# Patient Record
(demographics unavailable — no encounter records)

---

## 2024-10-28 NOTE — PHYSICAL EXAM
[No Supraclavicular Adenopathy] : no supraclavicular adenopathy [No Cervical Adenopathy] : no cervical adenopathy [Clear to Auscultation Bilat] : clear to auscultation bilaterally [Examined in the supine and seated position] : examined in the supine and seated position [Symmetrical] : symmetrical [No dominant masses] : no dominant masses in right breast  [No dominant masses] : no dominant masses left breast [No Nipple Retraction] : no left nipple retraction [No Nipple Discharge] : no left nipple discharge [No Axillary Lymphadenopathy] : no left axillary lymphadenopathy [No Rashes] : no rashes [de-identified] : In the lateral breast incision there is some underlying fine soft nodularity consistent with fat necrosis.

## 2024-10-28 NOTE — PHYSICAL EXAM
[No Supraclavicular Adenopathy] : no supraclavicular adenopathy [No Cervical Adenopathy] : no cervical adenopathy [Clear to Auscultation Bilat] : clear to auscultation bilaterally [Examined in the supine and seated position] : examined in the supine and seated position [Symmetrical] : symmetrical [No dominant masses] : no dominant masses in right breast  [No dominant masses] : no dominant masses left breast [No Nipple Retraction] : no left nipple retraction [No Nipple Discharge] : no left nipple discharge [No Axillary Lymphadenopathy] : no left axillary lymphadenopathy [No Rashes] : no rashes [de-identified] : In the lateral breast incision there is some underlying fine soft nodularity consistent with fat necrosis.

## 2024-10-28 NOTE — HISTORY OF PRESENT ILLNESS
[FreeTextEntry1] : 12/22 Left BCT + SN Moderately differentiated invasive ductal carcinoma, 2.4cm, ER/OH-+, HER2- with a Ki-67 of 10 to 15%, 0/1 node Close superior margin for DCIS T2N0M0, Stage 1b; Oncotype 19 Whole breast radiation, anastrozole  Patient has noted some upper outer quadrant nodularity.  She is getting a mammogram and ultrasound later on today.  Patient is taking her anastrozole and following up with medical oncology. Patiently recently fell down on her face requiring her to go to the emergency room but ultimately she was okay.  88-year-old postmenopausal woman with a family history of breast and other malignancies presents with a 1 month history of a palpable left breast mass.  Recent mammogram showed a 1.6 cm mass with calcifications and ultrasound showed in the left breast at the 4 o'clock position, 7 cm from the nipple a 2 cm mass that is abutting the dermis, BI-RADS 4.  Patient denies any other breast masses or bone pain.  Patient has never had a breast biopsy.  Patient's twin sister had breast cancer at age 58.  Patient is of Ashkenazi Denominational ancestry.  Patient has never taken hormone replacement therapy.  Invitae gene tested negative.

## 2024-10-28 NOTE — HISTORY OF PRESENT ILLNESS
[FreeTextEntry1] : 12/22 Left BCT + SN Moderately differentiated invasive ductal carcinoma, 2.4cm, ER/NH-+, HER2- with a Ki-67 of 10 to 15%, 0/1 node Close superior margin for DCIS T2N0M0, Stage 1b; Oncotype 19 Whole breast radiation, anastrozole  Patient has noted some upper outer quadrant nodularity.  She is getting a mammogram and ultrasound later on today.  Patient is taking her anastrozole and following up with medical oncology. Patiently recently fell down on her face requiring her to go to the emergency room but ultimately she was okay.  88-year-old postmenopausal woman with a family history of breast and other malignancies presents with a 1 month history of a palpable left breast mass.  Recent mammogram showed a 1.6 cm mass with calcifications and ultrasound showed in the left breast at the 4 o'clock position, 7 cm from the nipple a 2 cm mass that is abutting the dermis, BI-RADS 4.  Patient denies any other breast masses or bone pain.  Patient has never had a breast biopsy.  Patient's twin sister had breast cancer at age 58.  Patient is of Ashkenazi Mormon ancestry.  Patient has never taken hormone replacement therapy.  Invitae gene tested negative.

## 2024-10-28 NOTE — REASON FOR VISIT
[Follow-Up: _____] : a [unfilled] follow-up visit [FreeTextEntry1] : Status post left partial mastectomy for a stage Ib left breast cancer

## 2024-10-29 NOTE — ASSESSMENT
[FreeTextEntry1] : discussed DNR order with patient and son who agreed to the DNR order.  They will follow up with Dr. Morales to complete MOLST  will check labs and urine to r/o any identifiable cause of increased paranoia and fall  3122 filled out

## 2024-10-29 NOTE — PHYSICAL EXAM
[Alert] : alert [No Acute Distress] : in no acute distress [Normal Outer Ear/Nose] : the ears and nose were normal in appearance [Normal Appearance] : the appearance of the neck was normal [Supple] : the neck was supple [No Respiratory Distress] : no respiratory distress [No Acc Muscle Use] : no accessory muscle use [Respiration, Rhythm And Depth] : normal respiratory rhythm and effort [Auscultation Breath Sounds / Voice Sounds] : lungs were clear to auscultation bilaterally [Heart Rate And Rhythm] : heart rate was normal and rhythm regular [Bowel Sounds] : normal bowel sounds [Abdomen Tenderness] : non-tender [Abdomen Soft] : soft [No Spinal Tenderness] : no spinal tenderness [Normal Color / Pigmentation] : normal skin color and pigmentation [Normal Turgor] : normal skin turgor [No Focal Deficits] : no focal deficits [Oriented To Time, Place, And Person] : oriented to person, place, and time [Normal Voice/Communication] : normal voice/communication [Sclera] : the sclera and conjunctiva were normal [Normal S1, S2] : normal S1 and S2 [EOMI] : extraocular movements were intact [Normal Gait] : normal gait [Normal Hearing] : hearing was not normal [de-identified] : calm, cooperative [de-identified] : resolving facial bruies uner right eye and along bridge of nose. two small wounds left thumb 0.5 diameter, closed with dermabond [de-identified] : having difficulty remembering throughout the visit

## 2024-10-29 NOTE — PHYSICAL EXAM
[Alert] : alert [No Acute Distress] : in no acute distress [Normal Outer Ear/Nose] : the ears and nose were normal in appearance [Normal Appearance] : the appearance of the neck was normal [Supple] : the neck was supple [No Respiratory Distress] : no respiratory distress [No Acc Muscle Use] : no accessory muscle use [Respiration, Rhythm And Depth] : normal respiratory rhythm and effort [Auscultation Breath Sounds / Voice Sounds] : lungs were clear to auscultation bilaterally [Heart Rate And Rhythm] : heart rate was normal and rhythm regular [Bowel Sounds] : normal bowel sounds [Abdomen Tenderness] : non-tender [Abdomen Soft] : soft [No Spinal Tenderness] : no spinal tenderness [Normal Color / Pigmentation] : normal skin color and pigmentation [Normal Turgor] : normal skin turgor [No Focal Deficits] : no focal deficits [Oriented To Time, Place, And Person] : oriented to person, place, and time [Normal Voice/Communication] : normal voice/communication [Sclera] : the sclera and conjunctiva were normal [Normal S1, S2] : normal S1 and S2 [EOMI] : extraocular movements were intact [Normal Gait] : normal gait [Normal Hearing] : hearing was not normal [de-identified] : calm, cooperative [de-identified] : resolving facial bruies uner right eye and along bridge of nose. two small wounds left thumb 0.5 diameter, closed with dermabond [de-identified] : having difficulty remembering throughout the visit

## 2024-10-29 NOTE — HISTORY OF PRESENT ILLNESS
[FreeTextEntry1] : Having increased paranoia behaviors including barricading her door so that noone comes in to her room afraid there are people coming under her door and into her apartment despite no evidence of this  Had a fall on 10/24 and was seen at ED left thumb 2 small 0.5 wounds closed with dermabond and facial bruising

## 2024-10-29 NOTE — REASON FOR VISIT
[Follow-Up] : a follow-up visit [Family Member] : family member [FreeTextEntry1] : 7961 fill out [FreeTextEntry3] : Syed Saenz

## 2024-10-29 NOTE — REASON FOR VISIT
[Follow-Up] : a follow-up visit [Family Member] : family member [FreeTextEntry1] : 0879 fill out [FreeTextEntry3] : Syed Saenz

## 2024-11-06 NOTE — HISTORY OF PRESENT ILLNESS
[PMH Reviewed and Updated] : past medical history reviewed and updated [PSH Reviewed and Updated] : past surgical history reviewed and updated [Family History Reviewed and Updated] : family history reviewed and updated [Medication and Allergies Reconciled] : medication and allergies reconciled [Patient reported hearing was abnormal] : Patient reported hearing was abnormal [Patient reported vision is normal] : Patient reported vision is normal [Patient reported colon/rectal/cancer screening was normal] : Patient reported colon/rectal cancer screening was normal [Patient reported breast cancer screening was normal] : Patient reported breast cancer screening was normal [Patient reported cervical cancer screening was normal] : Patient reported cervical cancer screening was normal [Any fall with injury in past year] : Patient reported fall with injury in the past year [NO] : No [0] : 2) Feeling down, depressed, or hopeless: Not at all (0) [PHQ-2 Negative - No further assessment needed] : PHQ-2 Negative - No further assessment needed [I have developed a follow-up plan documented below in the note.] : I have developed a follow-up plan documented below in the note. [Retired] : retired from work [None] : The patient has no concerns about alcohol abuse [Never] : has never used illicit drugs [Non compliant with medications] : Non compliant with medications [Adequate] : adequate [Friends] : friends [Children] : children [Fully Independent] : fully independent [Does not drive] : does not drive [History of falls] : history of falls [Seatbelts] : seatbelts [Smoke Detectors] : smoke detectors [Carbon Monoxide Detector] : carbon monoxide detector [Bathroom Grab Bars] : bathroom grab bars [Fall Prevention Measures] : fall prevention measures [Advanced Directives Discussed] : discussed at today's visit [Patient Has Full Capacity] : this patient has full decision making capacity for discussion of advance care planning [I will adhere to the patient's wishes.] : I will adhere to the patient's wishes. [Time Spent: ___ minutes] : Time Spent: [unfilled] minutes [Over the Past 2 Weeks, Have You Felt Down, Depressed, or Hopeless?] : 1.) Over the past 2 weeks, have you felt down, depressed, or hopeless? No [Over the Past 2 Weeks, Have You Felt Little Interest or Pleasure Doing Things?] : 2.) Over the past 2 weeks, have you felt little interest or pleasure doing things? No [de-identified] : alone [FreeTextEntry1] :  Patient is a 90-year-old female with past medical history of left-sided breast cancer, glaucoma, osteoporosis 11/06/2024 Dementia - History of mild dementia - More paranoid than usual - Paranoia has reduced since starting antibiotics for UTI  Glaucoma - Not taking eye medication consistently  Osteoporosis  UTI - Not taking prescribed antibiotic (nitrofurantoin) consistently  Fall - Fell 10 days ago, resulting in scrapes on nose and hands - Walking on uneven ground beside the road when she fell - Denies feeling lightheaded or dizzy before the fall - No pain in face or hand where she fell - Healed well from the fall  Misc - Walks at least an hour every day  09/19/2024 Patient is here for cognitive assessment and care plan visit.  Cognitive careplan checklist summary:  Memory assessment: MoCA 20 out of 30 ADLs/IADLs: Done PHQ-2, NPI-Q: Done Med rec: Done, printout provided Home Safety: Done Caregiver assessment: Son is involved.  Needs more help with managing her medications. ACP documentation: MOST form copy provided. FAST score: 4 Decision-making capacity: Complex decision-making capacity intact  Has black/blue spot on left eyebrow. not sure what happened.  She thinks she likely fell but does not remember when.  Seeing endocrine for osteoporosis shortly.  Heme oncology also monitoring osteoporosis.  06/19/2024 CALIXTO WATKINS is a 90 year yo White  female is coming in today to establish care. Patient has PMHx as listed below.   due for annual wellness visit  #Breast ca left Stage Ib stopped anastrazole; should be taking tamoxifen every day.  no evidence of recurrence  #Prolapsed uterus s/p hysterectomy    walks for an hour.   has hearing issue.   Patient has word finding difficulty. no h/o stroke. Memory loss going on for 2 yrs. Patient has been living alone. She lives in [facility].  no caregiver +  Fell recently; does not remember   patient had issue with a resident in her [facility]. 4 yrs ago had episode with her where she realized that the resident was lisa when she called the patient in to her appoint. patient thinks that she may have gotten key to her apartment. She feels people have been in to her apartment.  Has motion sensing camera at home which have shown no one coming to her apartment.   Education: completed 4 yrs of college Work: retired; worked in office ETOH/Smoking:  never smoked or drank alcohol Weight loss/malnutrition: stable : ; 1 son Immunization:  need to know about pneumococcal Screening: done Depression screening:  Medication reconciliation: done Allergies: done     4M Geriatric Screening Tests   Based on The 4Ms While Caring for Older Adults, an evidence-based focus on the key areas of mentation, mobility, medications, and what matters most (a guide by the [employer] and the [employer])     Medications: -Anticholinergic burden: no     Mobility:    -Chronic pain: no -Constipation: yes; will start miralax -Urinary symptoms: no     Frail Scale: 0/5   -Are you fatigued? no -Can you walk up one flight of stairs?y -Can you walk one block?y -Do you have more than 5 illnesses?n -Have you lost more than 5% of your weight in last 6 months?n       Mentation: -Hx of dementia:n -h/o delirium:n -Cognitive enhancing agents:n       Sleep: 8 hrs or less STOP-BANG Snoring: no Tiredness: no Observed Apnea:[ ] Pressure: High blood pressure[ ] BMI: higher than 35.[ ] Age: older than 50[ ] Neck Circumference: greater than 16 inches is considered a risk factor.[ ] Gender: Males[ ]     Matters Most  Independent as long as possible   [BreastSonogramDate] : 07/2024 [TextBox_25] : has osteoporosis; every 2 yrs [TextBox_31] : due [FreeTextEntry3] : 10/24 [[id number]] : 0 [FreeTextEntry4] :  Today  CALIXTO would like to discuss advance care planning.  We discussed the patient's desire for care and treatment if she were to face an end-of-life illness or an illness that affects her decision-making capabilities.  We also discussed what measures the patient would like to undergo if her heart were to stop beating and/or she is not able to breathe on her own. Who was present during the visit? patient and son What decisions were made? DNR/DNI, no feeding tube or dialysis - Wants to stay out of the hospital and get treated outside of the hospital as much as possible - Wants to be kept comfortable if she were in uncontrolled pain What forms were completed/given to the patient? MOLST form

## 2024-11-06 NOTE — PHYSICAL EXAM
[No Respiratory Distress] : no respiratory distress [No Acc Muscle Use] : no accessory muscle use [Respiration, Rhythm And Depth] : normal respiratory rhythm and effort [Auscultation Breath Sounds / Voice Sounds] : lungs were clear to auscultation bilaterally [Normal S1, S2] : normal S1 and S2 [Heart Rate And Rhythm] : heart rate was normal and rhythm regular [de-identified] : healing bruise around right eye

## 2024-11-25 NOTE — PHYSICAL EXAM
[Alert] : alert [Healthy Appearance] : healthy appearance [No Acute Distress] : no acute distress [Well Developed] : well developed [Normal Voice/Communication] : normal voice communication [Normal Sclera/Conjunctiva] : normal sclera/conjunctiva [EOMI] : extra ocular movement intact [Normal Hearing] : hearing was normal [No Respiratory Distress] : no respiratory distress [Normal Rate and Effort] : normal respiratory rate and effort [Normal Rate] : heart rate was normal [No Edema] : no peripheral edema [Not Distended] : not distended [No Spinal Tenderness] : no spinal tenderness [Spine Straight] : spine straight [No Stigmata of Cushings Syndrome] : no stigmata of Cushings Syndrome [Normal Gait] : normal gait [No Involuntary Movements] : no involuntary movements were seen [Normal Strength/Tone] : muscle strength and tone were normal [No Rash] : no rash [Oriented x3] : oriented to person, place, and time [Normal Affect] : the affect was normal [Recent Memory Normal] : recent memory was not impaired [Normal Insight/Judgement] : insight and judgment were intact [Normal Mood] : the mood was normal [Kyphosis] : no kyphosis present [Scoliosis] : no scoliosis [de-identified] : No observed focal neurological deficits. [de-identified] : Impaired remote memory

## 2024-11-25 NOTE — ASSESSMENT
[FreeTextEntry1] : Dunia has osteoporosis of the hip with osteopenia in other sites measured.  Recent DEXA scan from July 2024.  Her minimal calcium intake and vitamin D deficiency along with history of aromatase inhibitor use likely exacerbated her postmenopausal decreased bone mineral density.  I recommend initiating treatment with antiresorptive therapy.  We discussed both oral and IV options.  Since patient will be residing at [facility], I am reassured that she will be able to take weekly Fosamax without issue.  First, I would like to evaluate her for secondary causes of osteoporosis, namely hyperparathyroidism and vitamin D deficiency. The results of the testing will be communicated to the patient over the phone once they are available, along with any additional recommendations if necessary. She may continue taking tamoxifen. Discussed possible adverse effects of osteoporosis therapy, including hypocalcemia, injection site reactions, musculoskeletal aches, osteonecrosis of the jaw, and atypical femoral fractures. These complications do not occur often, even rarely. Routine labs will be drawn every 6 months to monitor for hypocalcemia and vitamin D deficiency. I recommend regular dental evaluations to prevent the need for extractions or implants, thus maintaining jawbone health. We discussed that Reclast infusion may cause flu-like reactions and other injections may cause injection-site reactions.  To reduce the risk of AFF, we aim to limit the duration of consecutive treatments to less than 5-7 years. We may consider a drug holiday once annual DEXA scans demonstrate resolution of osteoporosis, but this will require continued annual surveillance to monitor for bone loss. If she experiences hip or groin pain during treatment, she should contact me immediately for an evaluation with hip x-rays to investigate for AFF. If a fragility fracture occurs during treatment, we will need to consider an alternative therapy. Discussed supportive management by ingesting 3327-6388 mg elemental calcium and up to 2000 units vitamin D daily through diet and supplementation. Encouraged weight-bearing exercises and avoidance of prolonged bedrest.  Patient will return for routine follow-up every 6 to 12 months with labs completed prior to the visit.  She was encouraged to contact the clinic at any point the interim with concerns and questions. All questions and concerns were fully addressed to patient's satisfaction. Patient is in agreement with stated plan.  Note: This document has been prepared using voice dictation software. While efforts have been made to ensure accuracy, some typographical or transcription errors may be present. Please interpret the information accordingly.

## 2024-11-25 NOTE — HISTORY OF PRESENT ILLNESS
[FreeTextEntry1] : Resident at [facility]. Osteoporosis diagnosed by DEXA  Previous osteoporosis treatments: Denies Last DEXA scan: July 2024; worst T-score of -2.8 in the hip, -1.9 in the femoral neck and spine. History of fragility fractures: Denies Prior chemotherapy or radiation therapy: Breast cancer history. On tamoxifen for years. Previously received breast radiation therapy.  Prior long-duration of glucocorticoid use: One short course of treatment following breast surgery. Family history of fractures, osteoporosis, or hyperparathyroidism: Denies Calcium and Vitamin D through diet and supplementation: No supplementation. Calcium-poor diet. Height loss: Denies Physically active: Participates in regular rehab at [place]. Problems with balance or vision: Denies History of frequent falls: Denies, though had a recent fall outside. History of hyperparathyroidism: Denies History of hypercalcemia: Denies History of kidney stones: Denies History of malabsorption or eating disorder:  Denies Prior long-duration use of proton pump inhibitors (PPIs), antidepressants, anticoagulants, loop diuretics, or anti-epileptic medications:  Denies Difficulty swallowing pills, heartburn or reflux symptoms: Denies Caffeine, smoking, and alcohol history:  Denies Prolonged amenorrhea or early menopause:  Denies History of clots: Denies ASCVD History: Denies Dentist following:  Yes, no extractions/implants planned.

## 2024-11-25 NOTE — REVIEW OF SYSTEMS
[Confusion] : confusion [Negative] : Endocrine [All other systems negative] : All other systems negative [Dizziness] : no dizziness [Difficulty Walking] : no difficulty walking [Poor Balance] : steady state balance [de-identified] : Poor memory

## 2024-11-25 NOTE — REASON FOR VISIT
[Initial Evaluation] : an initial evaluation [Osteoporosis] : osteoporosis [Other: _____] : [unfilled] [Source: ______] : History obtained from [facility]

## 2025-01-06 NOTE — PHYSICAL EXAM
[Alert] : alert [No Acute Distress] : in no acute distress [Normal Outer Ear/Nose] : the ears and nose were normal in appearance [Normal Appearance] : the appearance of the neck was normal [Supple] : the neck was supple [No Respiratory Distress] : no respiratory distress [No Acc Muscle Use] : no accessory muscle use [Respiration, Rhythm And Depth] : normal respiratory rhythm and effort [Auscultation Breath Sounds / Voice Sounds] : lungs were clear to auscultation bilaterally [Heart Rate And Rhythm] : heart rate was normal and rhythm regular [Bowel Sounds] : normal bowel sounds [Abdomen Tenderness] : non-tender [Abdomen Soft] : soft [No Spinal Tenderness] : no spinal tenderness [Normal Color / Pigmentation] : normal skin color and pigmentation [Normal Turgor] : normal skin turgor [No Focal Deficits] : no focal deficits [Normal Affect] : the affect was normal [Normal Mood] : the mood was normal [Normal Gait] : normal gait [de-identified] : somewhat disheveled. [de-identified] : Abdomen slightly distended
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
## 2025-01-08 NOTE — CONSULT LETTER
[Dear  ___] : Dear  [unfilled], [Consult Letter:] : I had the pleasure of evaluating your patient, [unfilled]. [Please see my note below.] : Please see my note below. [Consult Closing:] : Thank you very much for allowing me to participate in the care of this patient.  If you have any questions, please do not hesitate to contact me. [Sincerely,] : Sincerely, [FreeTextEntry3] : Alba Norris DO, FACASHA, FACP\par  Medical Oncology and Hematology\par  [facility]\par   [DrVamsi  ___] : Dr. DUPREE

## 2025-01-08 NOTE — ASSESSMENT
[FreeTextEntry1] : # L sided IDC, histologic grade 2 measuring 24mm in greatest dimension, all margins negative, LN neg, ER %, KY %, Her 2 negative, Ki 67 10-15%. Pathological pT2N0 - anatomically stage IIA, prognostically stage IB Reviewed the diagnosis, prognosis and natural history of ER/KY+, HER2- IDC she is s/p lumpectomy with SLNbx oncotype 19 - no need for chemo Receiving RT - finish 1/25/23 Saw Dr. William. Saw Dr Carrasquillo - recent notes reviewed  DEXA scan - shows osteoporosis - recommend tamoxifen daily. reviewed side effect profile mammo/us scheduled for 10/24.  #osteoporosis continue ca++ and vit D continue weight bearing exercise limit alcohol maintain healthy BMI discussed fosamax, prolia or reclast - reviewed side effects. will need dental clearance  # anxiety recommend wellness center  #memory impairment followed by PCP  RTC in 4 months with cbc with diff, cmp, vit D. [Patient/Caregiver unclear of wishes] : Patient/Caregiver unclear of wishes [AdvancecareDate] : 07/17/24

## 2025-01-08 NOTE — PHYSICAL EXAM
[Fully active, able to carry on all pre-disease performance without restriction] : Status 0 - Fully active, able to carry on all pre-disease performance without restriction [Normal] : affect appropriate [de-identified] : deferred as Dr. William did exam today

## 2025-01-08 NOTE — HISTORY OF PRESENT ILLNESS
[de-identified] : Ms. Saenz is an 88 year old woman who presents for initial consultation of left breast IDC.\par  She palpated an area of concern in her left breast\par  10/20/2022 mammo: indeterminant irregular mass measuring 2x 1.1x 1.4cm in left breast @ 4:00 7cm from nipple\par  10/26/2022 s/p US-guided core biopsy\par  Path: invasive moderately differentiated ductal carcinoma, histologic grade 6/9, tumor measuring 13mm in greatest dimension\par  ER %, MN %, Her 2 negative, Ki 67 10-15%\par  2022 s/p left partial mastectomy wand sentinel lymph node sampling by Dr. Washington\par  Path: IDC, histologic grade 2 measuring 24mm in greatest dimension, all margins negative\par  DCIS measuring 24mm\par  One lymph node negative\par  Pathological pT2pN0\par  seen by Dr. Carrasquillo for consultation of adjuvant radiation \par  \par  Age of Menarche: 11\par  Age of Menopause\par  OCP/HRT: denies\par  , 28 at first birth \par  \par  Health Maintenance:\par  GYN\par  Mammmo\par  CNY\par  DEXA\par  seen by Genetic counselor: stat panel negative [de-identified] : Pt here for follow up Overall feels well States she is currently not taking any medications

## 2025-01-27 NOTE — HISTORY OF PRESENT ILLNESS
[FreeTextEntry1] :  Patient is a 90-year-old female with past medical history of left-sided breast cancer, glaucoma, osteoporosis 01/27/2025   - History of Present Illness: The patient was recently moved into a new assisted living facility, which may have influenced a heightened level of anxiety about unfamiliar surroundings. Episodes of anxiety and disorientation have been noticeable, where the patient had called her son expressing fears of being moved against her will to another place. The frequency of these episodes, as reported by the caregivers and her son, is unclear. Additionally, the patient has also displayed some forgetfulness with misplaced belongings.   She has also undergone treatment for urinary tract infection recently.  11/06/2024 Dementia - History of mild dementia - More paranoid than usual - Paranoia has reduced since starting antibiotics for UTI  Glaucoma - Not taking eye medication consistently  Osteoporosis  UTI - Not taking prescribed antibiotic (nitrofurantoin) consistently  Fall - Fell 10 days ago, resulting in scrapes on nose and hands - Walking on uneven ground beside the road when she fell - Denies feeling lightheaded or dizzy before the fall - No pain in face or hand where she fell - Healed well from the fall  Misc - Walks at least an hour every day  09/19/2024 Patient is here for cognitive assessment and care plan visit.  Cognitive careplan checklist summary:  Memory assessment: MoCA 20 out of 30 ADLs/IADLs: Done PHQ-2, NPI-Q: Done Med rec: Done, printout provided Home Safety: Done Caregiver assessment: Son is involved.  Needs more help with managing her medications. ACP documentation: MOST form copy provided. FAST score: 4 Decision-making capacity: Complex decision-making capacity intact  Has black/blue spot on left eyebrow. not sure what happened.  She thinks she likely fell but does not remember when.  Seeing endocrine for osteoporosis shortly.  Heme oncology also monitoring osteoporosis.  06/19/2024 CALIXTO WATKINS is a 90 year yo White  female is coming in today to establish care. Patient has PMHx as listed below.   due for annual wellness visit  #Breast ca left Stage Ib stopped anastrazole; should be taking tamoxifen every day.  no evidence of recurrence  #Prolapsed uterus s/p hysterectomy    walks for an hour.   has hearing issue.   Patient has word finding difficulty. no h/o stroke. Memory loss going on for 2 yrs. Patient has been living alone. She lives in [facility].  no caregiver +  Fell recently; does not remember   patient had issue with a resident in her [facility]. 4 yrs ago had episode with her where she realized that the resident was lisa when she called the patient in to her appoint. patient thinks that she may have gotten key to her apartment. She feels people have been in to her apartment.  Has motion sensing camera at home which have shown no one coming to her apartment.   Education: completed 4 yrs of college Work: retired; worked in office ETOH/Smoking:  never smoked or drank alcohol Weight loss/malnutrition: stable : ; 1 son Immunization:  need to know about pneumococcal Screening: done Depression screening:  Medication reconciliation: done Allergies: done     4M Geriatric Screening Tests   Based on The 4Ms While Caring for Older Adults, an evidence-based focus on the key areas of mentation, mobility, medications, and what matters most (a guide by the [employer] and the [employer])     Medications: -Anticholinergic burden: no     Mobility:    -Chronic pain: no -Constipation: yes; will start miralax -Urinary symptoms: no     Frail Scale: 0/5   -Are you fatigued? no -Can you walk up one flight of stairs?y -Can you walk one block?y -Do you have more than 5 illnesses?n -Have you lost more than 5% of your weight in last 6 months?n       Mentation: -Hx of dementia:n -h/o delirium:n -Cognitive enhancing agents:n       Sleep: 8 hrs or less STOP-BANG Snoring: no Tiredness: no Observed Apnea:[ ] Pressure: High blood pressure[ ] BMI: higher than 35.[ ] Age: older than 50[ ] Neck Circumference: greater than 16 inches is considered a risk factor.[ ] Gender: Males[ ]     Matters Most  Independent as long as possible

## 2025-01-27 NOTE — HISTORY OF PRESENT ILLNESS
[FreeTextEntry1] :  Patient is a 90-year-old female with past medical history of left-sided breast cancer, glaucoma, osteoporosis 01/27/2025   - History of Present Illness: The patient was recently moved into a new assisted living facility, which may have influenced a heightened level of anxiety about unfamiliar surroundings. Episodes of anxiety and disorientation have been noticeable, where the patient had called her son expressing fears of being moved against her will to another place. The frequency of these episodes, as reported by the caregivers and her son, is unclear. Additionally, the patient has also displayed some forgetfulness with misplaced belongings.   She has also undergone treatment for urinary tract infection recently.  11/06/2024 Dementia - History of mild dementia - More paranoid than usual - Paranoia has reduced since starting antibiotics for UTI  Glaucoma - Not taking eye medication consistently  Osteoporosis  UTI - Not taking prescribed antibiotic (nitrofurantoin) consistently  Fall - Fell 10 days ago, resulting in scrapes on nose and hands - Walking on uneven ground beside the road when she fell - Denies feeling lightheaded or dizzy before the fall - No pain in face or hand where she fell - Healed well from the fall  Misc - Walks at least an hour every day  09/19/2024 Patient is here for cognitive assessment and care plan visit.  Cognitive careplan checklist summary:  Memory assessment: MoCA 20 out of 30 ADLs/IADLs: Done PHQ-2, NPI-Q: Done Med rec: Done, printout provided Home Safety: Done Caregiver assessment: Son is involved.  Needs more help with managing her medications. ACP documentation: MOST form copy provided. FAST score: 4 Decision-making capacity: Complex decision-making capacity intact  Has black/blue spot on left eyebrow. not sure what happened.  She thinks she likely fell but does not remember when.  Seeing endocrine for osteoporosis shortly.  Heme oncology also monitoring osteoporosis.  06/19/2024 CALIXTO WATKINS is a 90 year yo White  female is coming in today to establish care. Patient has PMHx as listed below.   due for annual wellness visit  #Breast ca left Stage Ib stopped anastrazole; should be taking tamoxifen every day.  no evidence of recurrence  #Prolapsed uterus s/p hysterectomy    walks for an hour.   has hearing issue.   Patient has word finding difficulty. no h/o stroke. Memory loss going on for 2 yrs. Patient has been living alone. She lives in [facility].  no caregiver +  Fell recently; does not remember   patient had issue with a resident in her [facility]. 4 yrs ago had episode with her where she realized that the resident was lisa when she called the patient in to her appoint. patient thinks that she may have gotten key to her apartment. She feels people have been in to her apartment.  Has motion sensing camera at home which have shown no one coming to her apartment.   Education: completed 4 yrs of college Work: retired; worked in office ETOH/Smoking:  never smoked or drank alcohol Weight loss/malnutrition: stable : ; 1 son Immunization:  need to know about pneumococcal Screening: done Depression screening:  Medication reconciliation: done Allergies: done     4M Geriatric Screening Tests   Based on The 4Ms While Caring for Older Adults, an evidence-based focus on the key areas of mentation, mobility, medications, and what matters most (a guide by the [place] [employer] and the [employer])     Medications: -Anticholinergic burden: no     Mobility:    -Chronic pain: no -Constipation: yes; will start miralax -Urinary symptoms: no     Frail Scale: 0/5   -Are you fatigued? no -Can you walk up one flight of stairs?y -Can you walk one block?y -Do you have more than 5 illnesses?n -Have you lost more than 5% of your weight in last 6 months?n       Mentation: -Hx of dementia:n -h/o delirium:n -Cognitive enhancing agents:n       Sleep: 8 hrs or less STOP-BANG Snoring: no Tiredness: no Observed Apnea:[ ] Pressure: High blood pressure[ ] BMI: higher than 35.[ ] Age: older than 50[ ] Neck Circumference: greater than 16 inches is considered a risk factor.[ ] Gender: Males[ ]     Matters Most  Independent as long as possible

## 2025-02-13 NOTE — PHYSICAL EXAM
[Alert] : alert [No Acute Distress] : in no acute distress [Sclera] : the sclera and conjunctiva were normal [PERRL] : pupils were equal in size, round, and reactive to light [de-identified] : 3 staples closing a ~1.5 laceration at crown of head. No swelling or ecchymosis. ~1 crusted, multicolored lesion on mid frontal scalp above forehead. -0.5 lesion on right cheek.

## 2025-02-13 NOTE — HISTORY OF PRESENT ILLNESS
[FreeTextEntry1] : fell in her assisted living room on Jan 27 due to loss of balance hit her head.  was seen in ER - small lac to crown of head 3 staples placed

## 2025-02-13 NOTE — PHYSICAL EXAM
[Alert] : alert [No Acute Distress] : in no acute distress [Sclera] : the sclera and conjunctiva were normal [PERRL] : pupils were equal in size, round, and reactive to light [de-identified] : 3 staples closing a ~1.5 laceration at crown of head. No swelling or ecchymosis. ~1 crusted, multicolored lesion on mid frontal scalp above forehead. -0.5 lesion on right cheek.

## 2025-02-27 NOTE — HISTORY OF PRESENT ILLNESS
[FreeTextEntry1] :  Patient is a 90-year-old female with past medical history of left-sided breast cancer, glaucoma, osteoporosis 02/27/2025    - History of Present Illness: The patient is an elderly woman living in a care facility. There have been no significant changes to her health status since our last meeting. She has dementia and is currently on a course of Memantine. Her weight has remained steady. She reports that her appetite is good, and she is sleeping comfortably through the night. There is a growth on her scalp, which is scheduled to be examined by a dermatologist in the coming weeks.    - Past Medical History: The patient has had breast surgery previously and is on tamoxifen. She's due for a follow-up with her oncologist in six months.  01/27/2025   - History of Present Illness: The patient was recently moved into a new assisted living facility, which may have influenced a heightened level of anxiety about unfamiliar surroundings. Episodes of anxiety and disorientation have been noticeable, where the patient had called her son expressing fears of being moved against her will to another place. The frequency of these episodes, as reported by the caregivers and her son, is unclear. Additionally, the patient has also displayed some forgetfulness with misplaced belongings.   She has also undergone treatment for urinary tract infection recently.  11/06/2024 Dementia - History of mild dementia - More paranoid than usual - Paranoia has reduced since starting antibiotics for UTI  Glaucoma - Not taking eye medication consistently  Osteoporosis  UTI - Not taking prescribed antibiotic (nitrofurantoin) consistently  Fall - Fell 10 days ago, resulting in scrapes on nose and hands - Walking on uneven ground beside the road when she fell - Denies feeling lightheaded or dizzy before the fall - No pain in face or hand where she fell - Healed well from the fall  Misc - Walks at least an hour every day  09/19/2024 Patient is here for cognitive assessment and care plan visit.  Cognitive careplan checklist summary:  Memory assessment: MoCA 20 out of 30 ADLs/IADLs: Done PHQ-2, NPI-Q: Done Med rec: Done, printout provided Home Safety: Done Caregiver assessment: Son is involved.  Needs more help with managing her medications. ACP documentation: MOST form copy provided. FAST score: 4 Decision-making capacity: Complex decision-making capacity intact  Has black/blue spot on left eyebrow. not sure what happened.  She thinks she likely fell but does not remember when.  Seeing endocrine for osteoporosis shortly.  Heme oncology also monitoring osteoporosis.  06/19/2024 CALIXTO WATKINS is a 90 year yo White  female is coming in today to establish care. Patient has PMHx as listed below.   due for annual wellness visit  #Breast ca left Stage Ib stopped anastrazole; should be taking tamoxifen every day.  no evidence of recurrence  #Prolapsed uterus s/p hysterectomy    walks for an hour.   has hearing issue.   Patient has word finding difficulty. no h/o stroke. Memory loss going on for 2 yrs. Patient has been living alone. She lives in [facility].  no caregiver +  Fell recently; does not remember   patient had issue with a resident in her [facility]. 4 yrs ago had episode with her where she realized that the resident was lisa when she called the patient in to her appoint. patient thinks that she may have gotten key to her apartment. She feels people have been in to her apartment.  Has motion sensing camera at home which have shown no one coming to her apartment.   Education: completed 4 yrs of college Work: retired; worked in office ETOH/Smoking:  never smoked or drank alcohol Weight loss/malnutrition: stable : ; 1 son Immunization:  need to know about pneumococcal Screening: done Depression screening:  Medication reconciliation: done Allergies: done     4M Geriatric Screening Tests   Based on The 4Ms While Caring for Older Adults, an evidence-based focus on the key areas of mentation, mobility, medications, and what matters most (a guide by the [place] [employer] and the [employer])     Medications: -Anticholinergic burden: no     Mobility:    -Chronic pain: no -Constipation: yes; will start miralax -Urinary symptoms: no     Frail Scale: 0/5   -Are you fatigued? no -Can you walk up one flight of stairs?y -Can you walk one block?y -Do you have more than 5 illnesses?n -Have you lost more than 5% of your weight in last 6 months?n       Mentation: -Hx of dementia:n -h/o delirium:n -Cognitive enhancing agents:n       Sleep: 8 hrs or less STOP-BANG Snoring: no Tiredness: no Observed Apnea:[ ] Pressure: High blood pressure[ ] BMI: higher than 35.[ ] Age: older than 50[ ] Neck Circumference: greater than 16 inches is considered a risk factor.[ ] Gender: Males[ ]     Matters Most  Independent as long as possible